# Patient Record
Sex: MALE | Race: WHITE
[De-identification: names, ages, dates, MRNs, and addresses within clinical notes are randomized per-mention and may not be internally consistent; named-entity substitution may affect disease eponyms.]

---

## 2020-11-01 ENCOUNTER — HOSPITAL ENCOUNTER (EMERGENCY)
Dept: HOSPITAL 52 - LL.ED | Age: 45
Discharge: HOME | End: 2020-11-01
Payer: COMMERCIAL
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
## 2020-11-01 DIAGNOSIS — Z23: ICD-10-CM

## 2020-11-01 DIAGNOSIS — S80.871A: Primary | ICD-10-CM

## 2020-11-01 NOTE — EDM.PDOC
ED HPI GENERAL MEDICAL PROBLEM





- General


Chief Complaint: Bite:Animal, Insect


Stated Complaint: question dog versus racoon bit


Time Seen by Provider: 11/01/20 14:05


Source of Information: Reports: Patient





- History of Present Illness


INITIAL COMMENTS - FREE TEXT/NARRATIVE: 





Pt presents to ER with possible bite to right anterior shin  States he was 

breaking up a fight between raccoon and his dog and got hit on anterior shin  

States initially the area was just a bruise and now has a small abrasion to the 

area  Does not think it was a direct bite or puncture  Tetanus 5 years ago


Onset: Today, Sudden


Location: Reports: Lower Extremity, Right


Context: Reports: Trauma


  ** Right Lower Leg


Pain Score (Numeric/FACES): 1





- Related Data


                                    Allergies











Allergy/AdvReac Type Severity Reaction Status Date / Time


 


No Known Allergies Allergy   Verified 11/01/20 13:59











Home Meds: 


                                    Home Meds





. [No Known Home Meds]  11/01/20 [History]











Past Medical History





- Past Surgical History


Musculoskeletal Surgical History: Reports: Other (See Below)


Other Musculoskeletal Surgeries/Procedures:: elbow tendon surgery Summer 2020





Social & Family History





- Tobacco Use


Tobacco Use Status *Q: Never Tobacco User





ED ROS GENERAL





- Review of Systems


Review Of Systems: See Below


Skin: Reports: Other (Possible bite wound)





ED EXAM, ANIMAL BITE





- Physical Exam


Exam: See Below


Exam Limited By: No Limitations


Skin Exam: Other (Small 1 cm abrasion to right anterior shin  No laceration or 

puncture wound)





Course





- Vital Signs


Last Recorded V/S: 





                                Last Vital Signs











Temp  97.9 F   11/01/20 14:04


 


Pulse  96   11/01/20 14:04


 


Resp  16   11/01/20 14:04


 


BP  107/76   11/01/20 14:04


 


Pulse Ox  96   11/01/20 14:04














- Orders/Labs/Meds


Orders: 





                               Active Orders 24 hr











 Category Date Time Status


 


 Vaccines to be Administered [RC] PER UNIT ROUTINE Care  11/01/20 14:19 Ordered











Meds: 





Medications














Discontinued Medications














Generic Name Dose Route Start Last Admin





  Trade Name Freq  PRN Reason Stop Dose Admin


 


Diphtheria/Tetanus/Acell Pertussis  0.5 ml  11/01/20 14:19 





  Boostrix  IM  11/01/20 14:20 





  .ONCE ONE  














- Re-Assessments/Exams


Free Text/Narrative Re-Assessment/Exam: 





11/01/20 14:24


Pt given tdap in ER  Pt and family looking for raccoon  Will bring it to vet 

when found  Will follow up in clinic  Pt given copy of rabies exposure flow 

sheet





Departure





- Departure


Time of Disposition: 14:30


Disposition: Home, Self-Care 01


Clinical Impression: 


 Animal bite








- Discharge Information


Instructions:  Animal Bite, Adult, Easy-to-Read


Referrals: 


Maria Victoria Hughes NP [Primary Care Provider] - 


Additional Instructions: 


Follow up in clinic


To ER if worse





Sepsis Event Note (ED)





- Evaluation


Sepsis Screening Result: No Definite Risk





- Focused Exam


Vital Signs: 





                                   Vital Signs











  Temp Pulse Resp BP Pulse Ox


 


 11/01/20 14:04  97.9 F  96  16  107/76  96














- My Orders


Last 24 Hours: 





My Active Orders





11/01/20 14:19


Vaccines to be Administered [RC] PER UNIT ROUTINE 














- Assessment/Plan


Last 24 Hours: 





My Active Orders





11/01/20 14:19


Vaccines to be Administered [RC] PER UNIT ROUTINE